# Patient Record
Sex: MALE | Race: BLACK OR AFRICAN AMERICAN | NOT HISPANIC OR LATINO | URBAN - METROPOLITAN AREA
[De-identification: names, ages, dates, MRNs, and addresses within clinical notes are randomized per-mention and may not be internally consistent; named-entity substitution may affect disease eponyms.]

---

## 2021-08-14 ENCOUNTER — INPATIENT (INPATIENT)
Facility: HOSPITAL | Age: 21
LOS: 0 days | Discharge: HOME | DRG: 310 | End: 2021-08-15
Attending: INTERNAL MEDICINE | Admitting: INTERNAL MEDICINE
Payer: COMMERCIAL

## 2021-08-14 VITALS
RESPIRATION RATE: 16 BRPM | WEIGHT: 199.96 LBS | OXYGEN SATURATION: 98 % | HEART RATE: 77 BPM | TEMPERATURE: 98 F | SYSTOLIC BLOOD PRESSURE: 126 MMHG | HEIGHT: 72 IN | DIASTOLIC BLOOD PRESSURE: 79 MMHG

## 2021-08-14 LAB
ALBUMIN SERPL ELPH-MCNC: 5.1 G/DL — HIGH (ref 3.4–5)
ALP SERPL-CCNC: 93 U/L — SIGNIFICANT CHANGE UP (ref 40–120)
ALT FLD-CCNC: 34 U/L — SIGNIFICANT CHANGE UP (ref 12–42)
ANION GAP SERPL CALC-SCNC: 12 MMOL/L — SIGNIFICANT CHANGE UP (ref 9–16)
AST SERPL-CCNC: 33 U/L — SIGNIFICANT CHANGE UP (ref 15–37)
BASOPHILS # BLD AUTO: 0.04 K/UL — SIGNIFICANT CHANGE UP (ref 0–0.2)
BASOPHILS NFR BLD AUTO: 0.4 % — SIGNIFICANT CHANGE UP (ref 0–2)
BILIRUB SERPL-MCNC: 1.1 MG/DL — SIGNIFICANT CHANGE UP (ref 0.2–1.2)
BUN SERPL-MCNC: 22 MG/DL — SIGNIFICANT CHANGE UP (ref 7–23)
CALCIUM SERPL-MCNC: 10.3 MG/DL — SIGNIFICANT CHANGE UP (ref 8.5–10.5)
CHLORIDE SERPL-SCNC: 101 MMOL/L — SIGNIFICANT CHANGE UP (ref 96–108)
CO2 SERPL-SCNC: 26 MMOL/L — SIGNIFICANT CHANGE UP (ref 22–31)
CREAT SERPL-MCNC: 1.43 MG/DL — HIGH (ref 0.5–1.3)
EOSINOPHIL # BLD AUTO: 0 K/UL — SIGNIFICANT CHANGE UP (ref 0–0.5)
EOSINOPHIL NFR BLD AUTO: 0 % — SIGNIFICANT CHANGE UP (ref 0–6)
GLUCOSE SERPL-MCNC: 83 MG/DL — SIGNIFICANT CHANGE UP (ref 70–99)
HCT VFR BLD CALC: 47 % — SIGNIFICANT CHANGE UP (ref 39–50)
HGB BLD-MCNC: 15.8 G/DL — SIGNIFICANT CHANGE UP (ref 13–17)
IMM GRANULOCYTES NFR BLD AUTO: 0.6 % — SIGNIFICANT CHANGE UP (ref 0–1.5)
LYMPHOCYTES # BLD AUTO: 0.85 K/UL — LOW (ref 1–3.3)
LYMPHOCYTES # BLD AUTO: 8.2 % — LOW (ref 13–44)
MAGNESIUM SERPL-MCNC: 1.9 MG/DL — SIGNIFICANT CHANGE UP (ref 1.6–2.6)
MCHC RBC-ENTMCNC: 27.3 PG — SIGNIFICANT CHANGE UP (ref 27–34)
MCHC RBC-ENTMCNC: 33.6 GM/DL — SIGNIFICANT CHANGE UP (ref 32–36)
MCV RBC AUTO: 81.2 FL — SIGNIFICANT CHANGE UP (ref 80–100)
MONOCYTES # BLD AUTO: 0.7 K/UL — SIGNIFICANT CHANGE UP (ref 0–0.9)
MONOCYTES NFR BLD AUTO: 6.8 % — SIGNIFICANT CHANGE UP (ref 2–14)
NEUTROPHILS # BLD AUTO: 8.69 K/UL — HIGH (ref 1.8–7.4)
NEUTROPHILS NFR BLD AUTO: 84 % — HIGH (ref 43–77)
NRBC # BLD: 0 /100 WBCS — SIGNIFICANT CHANGE UP (ref 0–0)
PLATELET # BLD AUTO: 209 K/UL — SIGNIFICANT CHANGE UP (ref 150–400)
POTASSIUM SERPL-MCNC: 5.2 MMOL/L — SIGNIFICANT CHANGE UP (ref 3.5–5.3)
POTASSIUM SERPL-SCNC: 5.2 MMOL/L — SIGNIFICANT CHANGE UP (ref 3.5–5.3)
PROT SERPL-MCNC: 8.6 G/DL — HIGH (ref 6.4–8.2)
RBC # BLD: 5.79 M/UL — SIGNIFICANT CHANGE UP (ref 4.2–5.8)
RBC # FLD: 13.2 % — SIGNIFICANT CHANGE UP (ref 10.3–14.5)
SARS-COV-2 RNA SPEC QL NAA+PROBE: SIGNIFICANT CHANGE UP
SODIUM SERPL-SCNC: 139 MMOL/L — SIGNIFICANT CHANGE UP (ref 132–145)
TROPONIN I SERPL-MCNC: <0.017 NG/ML — LOW (ref 0.02–0.06)
TSH SERPL-MCNC: 1.43 UIU/ML — SIGNIFICANT CHANGE UP (ref 0.36–3.74)
WBC # BLD: 10.34 K/UL — SIGNIFICANT CHANGE UP (ref 3.8–10.5)
WBC # FLD AUTO: 10.34 K/UL — SIGNIFICANT CHANGE UP (ref 3.8–10.5)

## 2021-08-14 PROCEDURE — 99285 EMERGENCY DEPT VISIT HI MDM: CPT | Mod: 25

## 2021-08-14 PROCEDURE — 71045 X-RAY EXAM CHEST 1 VIEW: CPT | Mod: 26,77

## 2021-08-14 PROCEDURE — 71045 X-RAY EXAM CHEST 1 VIEW: CPT | Mod: 26

## 2021-08-14 PROCEDURE — 93010 ELECTROCARDIOGRAM REPORT: CPT

## 2021-08-14 RX ORDER — SODIUM CHLORIDE 9 MG/ML
1000 INJECTION INTRAMUSCULAR; INTRAVENOUS; SUBCUTANEOUS ONCE
Refills: 0 | Status: COMPLETED | OUTPATIENT
Start: 2021-08-14 | End: 2021-08-14

## 2021-08-14 RX ORDER — METOPROLOL TARTRATE 50 MG
5 TABLET ORAL ONCE
Refills: 0 | Status: COMPLETED | OUTPATIENT
Start: 2021-08-14 | End: 2021-08-14

## 2021-08-14 RX ORDER — DILTIAZEM HCL 120 MG
5 CAPSULE, EXT RELEASE 24 HR ORAL
Qty: 125 | Refills: 0 | Status: DISCONTINUED | OUTPATIENT
Start: 2021-08-14 | End: 2021-08-14

## 2021-08-14 RX ADMIN — SODIUM CHLORIDE 2000 MILLILITER(S): 9 INJECTION INTRAMUSCULAR; INTRAVENOUS; SUBCUTANEOUS at 17:51

## 2021-08-14 RX ADMIN — SODIUM CHLORIDE 2000 MILLILITER(S): 9 INJECTION INTRAMUSCULAR; INTRAVENOUS; SUBCUTANEOUS at 19:05

## 2021-08-14 RX ADMIN — Medication 5 MILLIGRAM(S): at 19:05

## 2021-08-14 RX ADMIN — Medication 5 MG/HR: at 22:58

## 2021-08-14 NOTE — ED ADULT TRIAGE NOTE - CHIEF COMPLAINT QUOTE
sent from urgent care for eval of palpations that developed while pt was jogging, denies pmh of arrythmia's

## 2021-08-14 NOTE — ED ADULT NURSE NOTE - NSIMPLEMENTINTERV_GEN_ALL_ED
Implemented All Universal Safety Interventions:  [place] to call system. Call bell, personal items and telephone within reach. Instruct patient to call for assistance. Room bathroom lighting operational. Non-slip footwear when patient is off stretcher. Physically safe environment: no spills, clutter or unnecessary equipment. Stretcher in lowest position, wheels locked, appropriate side rails in place.

## 2021-08-14 NOTE — ED ADULT NURSE NOTE - OBJECTIVE STATEMENT
Patient to ED s/p going for a run today and started feeling like his heart was skipping a beat. He then went to urgent care when it did not improve and they took an EKG which showed an irregular rhythm and sent him here. He has never had any similar symptoms.

## 2021-08-14 NOTE — ED ADULT NURSE REASSESSMENT NOTE - NS ED NURSE REASSESS COMMENT FT1
Pt received from ISIAH Forbes. Pt resting comfortably in stretcher, continuous cardiac monitoring maintained. Pt denies complaints at this time, ambulatory with steady gait to the restroom. Noted irregular HR on monitor, rate ranging from 60s-80s. Pt denies palpitations, CP, SOB. No diaphoresis, no dyspnea noted. Breathing spontaneous and nonlabored.

## 2021-08-14 NOTE — ED ADULT NURSE REASSESSMENT NOTE - NS ED NURSE REASSESS COMMENT FT1
Noted pt HR to be in the range of 50s-60s. MD made aware, cardizem drip discontinued. Pt cleared for transport. Breathing spontaneous and nonlabored, pt denies complaints at this time.

## 2021-08-14 NOTE — ED PROVIDER NOTE - OBJECTIVE STATEMENT
20 y/o M, otherwise healthy, went for a run today and started feeling like his heart was skipping a beat. He then went to urgent care when it did not improve and they took an EKG which showed an irregular rhythm and sent him here. He has never had any similar symptoms. Denies any Hx of athymias. Denies any drug use. Non smoker. No leg pain or swelling. 20 y/o M, otherwise healthy, went for a run today and started feeling like his heart was skipping a beat. He then went to urgent care when it did not improve and they took an EKG which showed an irregular rhythm and sent him here. He has never had any similar symptoms. Denies any Hx of athymias. Denies any drug use. Non smoker. No leg pain or swelling. Denies any pre workout or supplement use.

## 2021-08-15 ENCOUNTER — TRANSCRIPTION ENCOUNTER (OUTPATIENT)
Age: 21
End: 2021-08-15

## 2021-08-15 VITALS
SYSTOLIC BLOOD PRESSURE: 144 MMHG | HEART RATE: 66 BPM | RESPIRATION RATE: 18 BRPM | OXYGEN SATURATION: 96 % | DIASTOLIC BLOOD PRESSURE: 67 MMHG

## 2021-08-15 DIAGNOSIS — J45.909 UNSPECIFIED ASTHMA, UNCOMPLICATED: ICD-10-CM

## 2021-08-15 DIAGNOSIS — I48.91 UNSPECIFIED ATRIAL FIBRILLATION: ICD-10-CM

## 2021-08-15 DIAGNOSIS — I48.92 UNSPECIFIED ATRIAL FLUTTER: ICD-10-CM

## 2021-08-15 LAB
A1C WITH ESTIMATED AVERAGE GLUCOSE RESULT: 5.7 % — HIGH (ref 4–5.6)
ALBUMIN SERPL ELPH-MCNC: 4.2 G/DL — SIGNIFICANT CHANGE UP (ref 3.3–5)
ALP SERPL-CCNC: 79 U/L — SIGNIFICANT CHANGE UP (ref 40–120)
ALT FLD-CCNC: 18 U/L — SIGNIFICANT CHANGE UP (ref 10–45)
AMPHET UR-MCNC: NEGATIVE — SIGNIFICANT CHANGE UP
ANION GAP SERPL CALC-SCNC: 11 MMOL/L — SIGNIFICANT CHANGE UP (ref 5–17)
APPEARANCE UR: CLEAR — SIGNIFICANT CHANGE UP
APTT BLD: 34.9 SEC — SIGNIFICANT CHANGE UP (ref 27.5–35.5)
AST SERPL-CCNC: 23 U/L — SIGNIFICANT CHANGE UP (ref 10–40)
BARBITURATES UR SCN-MCNC: NEGATIVE — SIGNIFICANT CHANGE UP
BENZODIAZ UR-MCNC: NEGATIVE — SIGNIFICANT CHANGE UP
BILIRUB SERPL-MCNC: 0.6 MG/DL — SIGNIFICANT CHANGE UP (ref 0.2–1.2)
BILIRUB UR-MCNC: NEGATIVE — SIGNIFICANT CHANGE UP
BUN SERPL-MCNC: 20 MG/DL — SIGNIFICANT CHANGE UP (ref 7–23)
CALCIUM SERPL-MCNC: 9.3 MG/DL — SIGNIFICANT CHANGE UP (ref 8.4–10.5)
CHLORIDE SERPL-SCNC: 106 MMOL/L — SIGNIFICANT CHANGE UP (ref 96–108)
CHOLEST SERPL-MCNC: 169 MG/DL — SIGNIFICANT CHANGE UP
CO2 SERPL-SCNC: 24 MMOL/L — SIGNIFICANT CHANGE UP (ref 22–31)
COCAINE METAB.OTHER UR-MCNC: NEGATIVE — SIGNIFICANT CHANGE UP
COLOR SPEC: YELLOW — SIGNIFICANT CHANGE UP
COVID-19 SPIKE DOMAIN AB INTERP: POSITIVE
COVID-19 SPIKE DOMAIN ANTIBODY RESULT: >250 U/ML — HIGH
CREAT SERPL-MCNC: 1.32 MG/DL — HIGH (ref 0.5–1.3)
DIFF PNL FLD: NEGATIVE — SIGNIFICANT CHANGE UP
ESTIMATED AVERAGE GLUCOSE: 117 MG/DL — HIGH (ref 68–114)
GLUCOSE SERPL-MCNC: 82 MG/DL — SIGNIFICANT CHANGE UP (ref 70–99)
GLUCOSE UR QL: NEGATIVE — SIGNIFICANT CHANGE UP
HCT VFR BLD CALC: 47.6 % — SIGNIFICANT CHANGE UP (ref 39–50)
HDLC SERPL-MCNC: 62 MG/DL — SIGNIFICANT CHANGE UP
HGB BLD-MCNC: 15.2 G/DL — SIGNIFICANT CHANGE UP (ref 13–17)
INR BLD: 0.98 — SIGNIFICANT CHANGE UP (ref 0.88–1.16)
KETONES UR-MCNC: NEGATIVE — SIGNIFICANT CHANGE UP
LEUKOCYTE ESTERASE UR-ACNC: NEGATIVE — SIGNIFICANT CHANGE UP
LIPID PNL WITH DIRECT LDL SERPL: 96 MG/DL — SIGNIFICANT CHANGE UP
MAGNESIUM SERPL-MCNC: 2.1 MG/DL — SIGNIFICANT CHANGE UP (ref 1.6–2.6)
MCHC RBC-ENTMCNC: 26.3 PG — LOW (ref 27–34)
MCHC RBC-ENTMCNC: 31.9 GM/DL — LOW (ref 32–36)
MCV RBC AUTO: 82.5 FL — SIGNIFICANT CHANGE UP (ref 80–100)
METHADONE UR-MCNC: NEGATIVE — SIGNIFICANT CHANGE UP
NITRITE UR-MCNC: NEGATIVE — SIGNIFICANT CHANGE UP
NON HDL CHOLESTEROL: 107 MG/DL — SIGNIFICANT CHANGE UP
NRBC # BLD: 0 /100 WBCS — SIGNIFICANT CHANGE UP (ref 0–0)
OPIATES UR-MCNC: NEGATIVE — SIGNIFICANT CHANGE UP
PCP SPEC-MCNC: SIGNIFICANT CHANGE UP
PCP UR-MCNC: NEGATIVE — SIGNIFICANT CHANGE UP
PH UR: 6 — SIGNIFICANT CHANGE UP (ref 5–8)
PLATELET # BLD AUTO: 207 K/UL — SIGNIFICANT CHANGE UP (ref 150–400)
POTASSIUM SERPL-MCNC: 4.2 MMOL/L — SIGNIFICANT CHANGE UP (ref 3.5–5.3)
POTASSIUM SERPL-SCNC: 4.2 MMOL/L — SIGNIFICANT CHANGE UP (ref 3.5–5.3)
PROT SERPL-MCNC: 6.6 G/DL — SIGNIFICANT CHANGE UP (ref 6–8.3)
PROT UR-MCNC: NEGATIVE MG/DL — SIGNIFICANT CHANGE UP
PROTHROM AB SERPL-ACNC: 11.8 SEC — SIGNIFICANT CHANGE UP (ref 10.6–13.6)
RBC # BLD: 5.77 M/UL — SIGNIFICANT CHANGE UP (ref 4.2–5.8)
RBC # FLD: 13.6 % — SIGNIFICANT CHANGE UP (ref 10.3–14.5)
SARS-COV-2 IGG+IGM SERPL QL IA: >250 U/ML — HIGH
SARS-COV-2 IGG+IGM SERPL QL IA: POSITIVE
SODIUM SERPL-SCNC: 141 MMOL/L — SIGNIFICANT CHANGE UP (ref 135–145)
SP GR SPEC: >=1.03 — SIGNIFICANT CHANGE UP (ref 1–1.03)
THC UR QL: NEGATIVE — SIGNIFICANT CHANGE UP
TRIGL SERPL-MCNC: 56 MG/DL — SIGNIFICANT CHANGE UP
UROBILINOGEN FLD QL: 0.2 E.U./DL — SIGNIFICANT CHANGE UP
WBC # BLD: 6.1 K/UL — SIGNIFICANT CHANGE UP (ref 3.8–10.5)
WBC # FLD AUTO: 6.1 K/UL — SIGNIFICANT CHANGE UP (ref 3.8–10.5)

## 2021-08-15 PROCEDURE — 87635 SARS-COV-2 COVID-19 AMP PRB: CPT

## 2021-08-15 PROCEDURE — 71045 X-RAY EXAM CHEST 1 VIEW: CPT

## 2021-08-15 PROCEDURE — 84443 ASSAY THYROID STIM HORMONE: CPT

## 2021-08-15 PROCEDURE — 96374 THER/PROPH/DIAG INJ IV PUSH: CPT

## 2021-08-15 PROCEDURE — 99223 1ST HOSP IP/OBS HIGH 75: CPT

## 2021-08-15 PROCEDURE — 85610 PROTHROMBIN TIME: CPT

## 2021-08-15 PROCEDURE — 80061 LIPID PANEL: CPT

## 2021-08-15 PROCEDURE — 80307 DRUG TEST PRSMV CHEM ANLYZR: CPT

## 2021-08-15 PROCEDURE — 80053 COMPREHEN METABOLIC PANEL: CPT

## 2021-08-15 PROCEDURE — 84484 ASSAY OF TROPONIN QUANT: CPT

## 2021-08-15 PROCEDURE — 83735 ASSAY OF MAGNESIUM: CPT

## 2021-08-15 PROCEDURE — 99253 IP/OBS CNSLTJ NEW/EST LOW 45: CPT

## 2021-08-15 PROCEDURE — 86769 SARS-COV-2 COVID-19 ANTIBODY: CPT

## 2021-08-15 PROCEDURE — 81003 URINALYSIS AUTO W/O SCOPE: CPT

## 2021-08-15 PROCEDURE — 99285 EMERGENCY DEPT VISIT HI MDM: CPT

## 2021-08-15 PROCEDURE — 83036 HEMOGLOBIN GLYCOSYLATED A1C: CPT

## 2021-08-15 PROCEDURE — 85730 THROMBOPLASTIN TIME PARTIAL: CPT

## 2021-08-15 PROCEDURE — 36415 COLL VENOUS BLD VENIPUNCTURE: CPT

## 2021-08-15 PROCEDURE — 85027 COMPLETE CBC AUTOMATED: CPT

## 2021-08-15 PROCEDURE — 85025 COMPLETE CBC W/AUTO DIFF WBC: CPT

## 2021-08-15 PROCEDURE — 93005 ELECTROCARDIOGRAM TRACING: CPT

## 2021-08-15 RX ORDER — DILTIAZEM HCL 120 MG
1 CAPSULE, EXT RELEASE 24 HR ORAL
Qty: 30 | Refills: 2
Start: 2021-08-15 | End: 2021-11-12

## 2021-08-15 RX ORDER — SODIUM CHLORIDE 9 MG/ML
1000 INJECTION INTRAMUSCULAR; INTRAVENOUS; SUBCUTANEOUS
Refills: 0 | Status: DISCONTINUED | OUTPATIENT
Start: 2021-08-15 | End: 2021-08-15

## 2021-08-15 RX ORDER — ALBUTEROL 90 UG/1
0 AEROSOL, METERED ORAL
Qty: 0 | Refills: 0 | DISCHARGE

## 2021-08-15 RX ORDER — FLECAINIDE ACETATE 50 MG
300 TABLET ORAL ONCE
Refills: 0 | Status: COMPLETED | OUTPATIENT
Start: 2021-08-15 | End: 2021-08-15

## 2021-08-15 RX ORDER — APIXABAN 2.5 MG/1
5 TABLET, FILM COATED ORAL EVERY 12 HOURS
Refills: 0 | Status: DISCONTINUED | OUTPATIENT
Start: 2021-08-15 | End: 2021-08-15

## 2021-08-15 RX ORDER — DILTIAZEM HCL 120 MG
120 CAPSULE, EXT RELEASE 24 HR ORAL DAILY
Refills: 0 | Status: DISCONTINUED | OUTPATIENT
Start: 2021-08-15 | End: 2021-08-15

## 2021-08-15 RX ORDER — APIXABAN 2.5 MG/1
1 TABLET, FILM COATED ORAL
Qty: 14 | Refills: 0
Start: 2021-08-15 | End: 2021-08-21

## 2021-08-15 RX ADMIN — Medication 300 MILLIGRAM(S): at 15:06

## 2021-08-15 RX ADMIN — Medication 120 MILLIGRAM(S): at 12:30

## 2021-08-15 RX ADMIN — APIXABAN 5 MILLIGRAM(S): 2.5 TABLET, FILM COATED ORAL at 12:30

## 2021-08-15 RX ADMIN — SODIUM CHLORIDE 100 MILLILITER(S): 9 INJECTION INTRAMUSCULAR; INTRAVENOUS; SUBCUTANEOUS at 09:47

## 2021-08-15 NOTE — PROGRESS NOTE ADULT - ASSESSMENT
22yo Male w/ FHx of HTN (denies FHx of cardiac arrhythmias) and PMHx Asthma who presents to [facility] ED 8/14/2021 endorsing palpitations. Found to be in new onset Atrial fib. Pt admitted to cardiac telemetry w/ plan for EP consult

## 2021-08-15 NOTE — DISCHARGE NOTE PROVIDER - PROVIDER TOKENS
PROVIDER:[TOKEN:[[id number]],FOLLOWUP:[2 weeks]],PROVIDER:[TOKEN:[[id number]],FOLLOWUP:[2 weeks]]

## 2021-08-15 NOTE — DISCHARGE NOTE PROVIDER - CARE PROVIDER_API CALL
Elliot Heller)  Cardiovascular Disease  [address] [place] [address], [address] [place] [id number]  Phone: [phone]  Fax: [phone]  Follow Up Time: 2 weeks    Delmi Willett)  Cardiac Electrophysiology; Cardiovascular Disease; Internal Medicine  [address] [address]  Phone: [phone]  Fax: [phone]  Follow Up Time: 2 weeks

## 2021-08-15 NOTE — H&P ADULT - PROBLEM SELECTOR PLAN 1
--Onset of palpiations after running; [facility] pt is persistently A Flutter.    --s/p Lopressor 5mg IV x1 and then initiated on Diltiazem gtt at dose rate 5mL/Hr, which was discontinued prior to transfer to [facility].     --TSH WNL; denies recreational drugs; denies use of pre-work out or supplements; denies caffeine.    --HR ranging 50-80s.   --Urine toxicology ordered (but pt denies drug use; reliable).   --No indication for rate controlling agents or anticoagulation at this time (CHADS-Vasc 0).   --PLAN: admit to cardiac telemetry for monitoring; EP consult in AM; TTE ordered.

## 2021-08-15 NOTE — H&P ADULT - PROBLEM SELECTOR PLAN 2
--Pt takes albuterol as needed.   --No exacerbation at this time.       Dispo: pending EP evaluation.

## 2021-08-15 NOTE — CONSULT NOTE ADULT - ASSESSMENT
1. New-onset atrial fibrillation    -Pathophysiology and risks of atrial fibrillation were discussed in detail.  His apparent CHADS-VASc is 0, but since he will likely need either chemical or electrical conversion, continue Eliquis.  -Options for rhythm control were reviewed.  This is his first episode, so we can attempt cardioversion only without additional medication and monitor for recurrence.  We also discussed antiarrhythmic medications and ablation as options in the future.  -Check echo (either while here if he is here tomorrow, or as outpatient).  -Flecainide 300mg x 1 dose to attempt chemical conversion.  Monitor on telemetry.  -If he converts to sinus rhythm, he can be discharged later today.  Given the duration of AF, continue Eliquis post-conversion for one week.  -If he remains in AF, will plan cardioversion tomorrow.  Procedure and risks were discussed.  -He should have outpatient EP follow-up.

## 2021-08-15 NOTE — DISCHARGE NOTE PROVIDER - CARE PROVIDERS DIRECT ADDRESSES
[email or web address]

## 2021-08-15 NOTE — PROGRESS NOTE ADULT - PROBLEM SELECTOR PLAN 1
Onset of palpitations after running; [facility] pt is persistently A Flutter. Appears to be in Afib @ [facility].   - initially received IV lopressor and Dilt Gtt @ [facility] which was discontinued as patient HR controlled in 50-80s.  This AM 8/15 patient HR increased to 90-100s.   - Started on Cardizem XL 120mg   - CHADS-Vasc 0, but given potential plan for cardioversion started on Eliquis 5mg PO BID.   - EP consulted, f/u recs.   - TSH WNL; denies recreational drugs; denies use of pre-work out or supplements; denies caffeine. Utox negative.   - Echo ordered.

## 2021-08-15 NOTE — PROGRESS NOTE ADULT - SUBJECTIVE AND OBJECTIVE BOX
Interventional Cardiology PA Adult Progress Note    Subjective Assessment: Patient seen and examined at bedside, no current compoaints, intermittent feels the palpitations but mild  ROS negative except per HPI and subjective  	  MEDICATIONS:  diltiazem    milliGRAM(s) Oral daily  apixaban 5 milliGRAM(s) Oral every 12 hours  sodium chloride 0.9%. 1000 milliLiter(s) IV Continuous <Continuous>    [PHYSICAL EXAM:  TELEMETRY:  T(C): 36.8 (08-15-21 @ 05:20), Max: 37.2 (08-14-21 @ 21:32)  HR: 60 (08-15-21 @ 00:00) (56 - 82)  BP: 131/88 (08-15-21 @ 00:00) (126/79 - 135/66)  RR: 18 (08-15-21 @ 00:00) (16 - 18)  SpO2: 98% (08-15-21 @ 00:00) (98% - 99%)    I&O's Summary    14 Aug 2021 07:01  -  15 Aug 2021 07:00  --------------------------------------------------------  IN: 180 mL / OUT: 0 mL / NET: 180 mL    15 Aug 2021 07:01  -  15 Aug 2021 11:50  --------------------------------------------------------  IN: 300 mL / OUT: 0 mL / NET: 300 mL      Height (cm): 182.9 (08-14 @ 17:13)  Weight (kg): 90.7 (08-14 @ 17:13)  BMI (kg/m2): 27.1 (08-14 @ 17:13)  BSA (m2): 2.13 (08-14 @ 17:13)                                      Appearance: Normal	  HEENT:   Normal oral mucosa, PERRL, EOMI	  Neck: Supple, - JVD; Carotid Bruit   Cardiovascular: Normal S1 S2, No JVD, No murmurs,   Respiratory: Lungs clear to auscultation//No Rales, Rhonchi, Wheezing	  Gastrointestinal:  Soft, Non-tender, + BS	  Skin: No rashes, No ecchymoses, No cyanosis  Extremities: Normal range of motion, No clubbing, cyanosis or edema  Vascular: Peripheral pulses palpable 2+ bilaterally  Neurologic: Non-focal  Psychiatry: A & O x 3, Mood & affect appropriate      LABS:	 	  CARDIAC MARKERS:  Troponin I, Serum: <0.017 ng/mL (08-14 @ 17:52)  Troponin I, Serum: <0.017 ng/mL (08-14 @ 17:52)                          15.2   6.10  )-----------( 207      ( 15 Aug 2021 07:09 )             47.6     08-15    141  |  106  |  20  ----------------------------<  82  4.2   |  24  |  1.32<H>    Ca    9.3      15 Aug 2021 07:09  Mg     2.1     08-15    TPro  6.6  /  Alb  4.2  /  TBili  0.6  /  DBili  x   /  AST  23  /  ALT  18  /  AlkPhos  79  08-15    proBNP:   Lipid Profile:   HgA1c:   TSH: Thyroid Stimulating Hormone, Serum: 1.430 uIU/mL (08-14 @ 17:52)    PT/INR - ( 15 Aug 2021 07:09 )   PT: 11.8 sec;   INR: 0.98          PTT - ( 15 Aug 2021 07:09 )  PTT:34.9 sec    ASSESSMENT/PLAN: 	        DVT ppx:  Dispo:

## 2021-08-15 NOTE — H&P ADULT - NSHPLABSRESULTS_GEN_ALL_CORE
15.8   10.34 )-----------( 209      ( 14 Aug 2021 17:52 )             47.0     139  |  101  |  22  ----------------------------<  83  5.2   |  26  |  1.43<H>    Ca    10.3      14 Aug 2021 17:52  Mg     1.9     08-14    TPro  8.6<H>  /  Alb  5.1<H>  /  TBili  1.1  /  DBili  x   /  AST  33  /  ALT  34  /  AlkPhos  93  08-14    CARDIAC MARKERS ( 14 Aug 2021 17:52 )  <0.017 ng/mL / x     / x     / x     / x          EKG: atrial flutter rate controlled

## 2021-08-15 NOTE — H&P ADULT - HISTORY OF PRESENT ILLNESS
Pt is 22yo Male w/ FHx of HTN (denies FHx of cardiac arrhythmias) and PMHx Asthma who presents to [facility] ED 8/14/2021 endorsing palpitations. Pt states that when he went for a run today and started to feel like his heart was skipping a beat. He went to an urgent care, and was sent to the ED due to irregular rhythm. He has never had symptoms. Pt denies Hx of arrhythmias, denies recreational drug use, denies tobacco use, denies use of caffeine or pre-workout or supplement use. Pt denies CP, SOB, dizziness/syncope, LE edema, orthopnea, abdominal pain, N/V, fever/chills, pain in lower extremities.      VITALS: HR 56-82, BP 130s/66-82, O2 98% RA, T 98.5F. LABS: WBC 10.34, Hgb/Hct 15.8/47.0, Plt Cnt 209; Na 138, K 5.2, Mg 1.9; BUN/Cr 22/1.43; TSH WNL; Trop I (-); COVID-19 (-). CXR wet read grossly normal. EKG atrial flutter.      At [facility] ED, pt received Lopressor 5mg IV x1, and then was initiated on Diltiazem gtt at 5mL/Hr (which was subsequently discontinued prior to transfer as HR was well controlled).      Patient transferred to [facility] cardiology/telemetry for further work up and management of new onset atrial flutter.

## 2021-08-15 NOTE — DISCHARGE NOTE PROVIDER - NSDCMRMEDTOKEN_GEN_ALL_CORE_FT
albuterol:   apixaban 5 mg oral tablet: 1 tab(s) orally every 12 hours  dilTIAZem 120 mg/24 hours oral capsule, extended release: 1 cap(s) orally once a day

## 2021-08-15 NOTE — DISCHARGE NOTE NURSING/CASE MANAGEMENT/SOCIAL WORK - PATIENT PORTAL LINK FT
You can access the FollowMyHealth Patient Portal offered by [facility] by registering at the following website: [email or web address]. By joining [facility]’s FollowMyHealth portal, you will also be able to view your health information using other applications (apps) compatible with our system.

## 2021-08-15 NOTE — DISCHARGE NOTE PROVIDER - NSDCCPCAREPLAN_GEN_ALL_CORE_FT
PRINCIPAL DISCHARGE DIAGNOSIS  Diagnosis: Atrial fibrillation and flutter  Assessment and Plan of Treatment: You were found to have atrial fibrillation which is an irregular heart rythm that can effect the function of your heart and put you at a higher risk for stroke  - You were evaluated by the electrophysiolgy team and received an anti arrythmic medication called Flecainide and converted back to normal sinus ryhtm  - please Start taking Cardizem XL 120mg daily for control of your heart rate  - please start taking Eliquis 5mg Twice daily for one week only - blood thinner to prevent blood clot  - Follow up with Dr. Heller (Cardiologist) @ [facility] in 1-2 weeks  - Follow up wiht Dr. Willett @ [facility] (Electrophysiologist) in 1-2 weeks.

## 2021-08-15 NOTE — DISCHARGE NOTE PROVIDER - NSDCFUSCHEDAPPT_GEN_ALL_CORE_FT
JULIO MCKEON ; 08/18/2021 ; [facility] [address]  JULIO MCKEON ; 10/25/2021 ; NPP Cardio V[facility] [address]

## 2021-08-15 NOTE — H&P ADULT - ASSESSMENT
Pt is 20yo Male w/ FHx of HTN (denies FHx of cardiac arrhythmias) and PMHx Asthma who presents to [facility] ED 8/14/2021 endorsing palpitations. Found to be in new onset Atrial flutter. Pt is rate controlled w/ HR 50-80s. No indicated for rate control agent or anticoagulation at this time. Pt admitted to cardiac telemetry. TSH normal, no drugs per pt (toxicology pending), denies caffeine (no coffee), denies preworkout or supplement use. EP to be consulted in morning.

## 2021-08-15 NOTE — CONSULT NOTE ADULT - SUBJECTIVE AND OBJECTIVE BOX
22 yo male with asthma who is admitted with new-onset atrial fibrillation.  He went for a run yesterday and after finishing he felt ok.  Shortly thereafter, he felt his heart skipping a beat.  He took a cold shower and tried to calm down, but the feeling continued so he went to urgent care and was sent to the ER.  He was treated with IV metoprolol, and subsequently cardizem drip.  Rates improved, so the drip was discontinued.  He was given additional cardizem this morning.  He was given a dose of Eliquis just now.    He remains in atrial fibrillation and notes continued palpitations.  He denies any similar prior episodes.  No family history of arrhythmia or sudden death.  Notes family history of hypertension.  He took his inhaler for asthma as he normally does prior to running.  Notes occasional alcohol use.  Denies smoking or illicit drug use.  He denies chest pain or dyspnea with exertion.  No dizziness or syncope.  No fever, chills, nausea, vomiting, or diarrhea.  He was otherwise feeling normal yesterday.    PMH: as above  Home Meds: asthma inhaler    All: NKDA    Soc: as per HPI    EXAM:  Vital Signs Last 24 Hrs  T(C): 36.8 (15 Aug 2021 05:20), Max: 37.2 (14 Aug 2021 21:32)  T(F): 98.3 (15 Aug 2021 05:20), Max: 99 (14 Aug 2021 21:32)  HR: 76 (15 Aug 2021 12:30) (56 - 82)  BP: 124/82 (15 Aug 2021 12:30) (124/82 - 139/80)  BP(mean): 104 (15 Aug 2021 00:00) (104 - 104)  RR: 18 (15 Aug 2021 12:30) (16 - 18)  SpO2: 99% (15 Aug 2021 12:30) (96% - 99%)    -nad, in bed  -alert and oriented  -no carotid bruit  -irregular, no murmurs or gallops  -lungs clear  -soft, nt  -no sig edema  -normal mood and affect                          15.2   6.10  )-----------( 207      ( 15 Aug 2021 07:09 )             47.6   08-15    141  |  106  |  20  ----------------------------<  82  4.2   |  24  |  1.32<H>    Ca    9.3      15 Aug 2021 07:09  Mg     2.1     08-15    TPro  6.6  /  Alb  4.2  /  TBili  0.6  /  DBili  x   /  AST  23  /  ALT  18  /  AlkPhos  79  08-15  PT/INR - ( 15 Aug 2021 07:09 )   PT: 11.8 sec;   INR: 0.98          PTT - ( 15 Aug 2021 07:09 )  PTT:34.9 sec    EKG: sinus rhythm

## 2021-08-15 NOTE — DISCHARGE NOTE PROVIDER - HOSPITAL COURSE
20yo Male w/ FHx of HTN (denies FHx of cardiac arrhythmias) and PMHx Asthma who presents to [facility] ED 8/14/2021 endorsing palpitations. Found to be in new onset Atrial fib. Pt admitted to cardiac telemetry w/ plan for EP consult    Problem/Plan - 1:  ·  Problem: Atrial flutter.  Plan: --Onset of palpiations after running; [facility] pt is persistently A Flutter.    --s/p Lopressor 5mg IV x1 and then initiated on Diltiazem gtt at dose rate 5mL/Hr, which was discontinued prior to transfer to [facility].     --TSH WNL; denies recreational drugs; denies use of pre-work out or supplements; denies caffeine.    --HR ranging 50-80s.   --Urine toxicology ordered (but pt denies drug use; reliable).   --No indication for rate controlling agents or anticoagulation at this time (CHADS-Vasc 0).   --PLAN: admit to cardiac telemetry for monitoring; EP consult in AM; TTE ordered.      Problem/Plan - 2:  ·  Problem: Asthma.  Plan: --Pt takes albuterol as needed.   --No exacerbation at this time.       Dispo: pending EP evaluation.    Problem/Plan - 1:  ·  Problem: Atrial fibrillation and flutter.  Plan: Onset of palpitations after running; [facility] pt is persistently A Flutter. Appears to be in Afib @ [facility].   - initially received IV lopressor and Dilt Gtt @ [facility] which was discontinued as patient HR controlled in 50-80s.  This AM 8/15 patient HR increased to 90-100s.   - Started on Cardizem XL 120mg   - CHADS-Vasc 0, but given potential plan for cardioversion started on Eliquis 5mg PO BID.   - EP consulted, f/u recs.   - TSH WNL; denies recreational drugs; denies use of pre-work out or supplements; denies caffeine. Utox negative.   - Echo ordered.      Problem/Plan - 2:  ·  Problem: Asthma.  Plan: --Pt takes albuterol as needed.   --No exacerbation at this time.       Dispo: pending EP evaluation.    20yo Male w/ FHx of HTN (denies FHx of cardiac arrhythmias) and PMHx Asthma who presents to [facility] ED 8/14/2021 endorsing palpitations after finishing a run.  At [facility] ED EKG revealing Aflutter.  Patient s/p Lopressor and Dilt Gtt which was discontinued initially as patient HR controlled.  Patient transferred to [facility] for new Afib.  THS wnl.  Urine Toxicology negative and patient denies any drug use, supplementation, ETOH, or significant caffeine use.  Patient initiated on Cardizem [id number] QD.  EP service consulted and patient is s/p Eliquis and Flecainide 300mg PO x1 w/ successful conversion to NSR.  Labs, Vitals, meds reviewed with Dr. Macdonald and patient deemed stable for discharge home.  He is discharged home with Cardizem CD 120mg daily and Eliquis 5mg PO BID x1 week.  CHADS-Vasc score 0 but given conversion to NSR patient recommended by EP to take AC for at least one week.  He is recommended to follow up with the Cardiologist Dr. Heller and Electrophysiologist Dr. Willett in 1-2 weeks.  New prescriptions are E prescribed to pharmacy of choice.          20yo Male w/ FHx of HTN (denies FHx of cardiac arrhythmias) and PMHx Asthma who presents to [facility] ED 8/14/2021 endorsing palpitations after finishing a run.  At [facility] ED EKG revealing Aflutter.  Patient s/p Lopressor and Dilt Gtt which was discontinued initially as patient HR controlled.  Patient transferred to [facility] for new Afib.  THS wnl.  Urine Toxicology negative and patient denies any drug use, supplementation, ETOH, or significant caffeine use.  Patient initiated on Cardizem [id number] QD.  EP service consulted and patient is s/p Eliquis and Flecainide 300mg PO x1 w/ successful conversion to NSR.  Labs, Vitals, meds reviewed with Dr. Macdonald and patient deemed stable for discharge home.  He is discharged home with Cardizem CD 120mg daily and Eliquis 5mg PO BID x1 week.  CHADS-Vasc score 0 but given conversion to NSR patient recommended by EP to take AC for at least one week.  He is recommended to follow up with the Cardiologist Dr. Heller and Electrophysiologist Dr. Willett in 1-2 weeks.  New prescriptions are E prescribed to pharmacy of choice.     I was physically present for the key portions of the evaluation and management (E/M) service provided.  I have personally seen and examined this patient.  I have reviewed vitals, labs, medications, cardiac studies and remaining additional imaging.  I agree with the above discharge documentation which I have reviewed and edited where appropriate. Patient is hemodynamically stable and appropriate for discharge home on the above prescribed medications.  Close outpatient cardiology follow up is recommended within 1-2 weeks.    Brandy Macdonald MD   Cardiology Attending    35 minutes spent on total encounter; more than 50% of the visit was spent counseling and/or coordinating care by the attending physician, with plan of care discussed with the patient and cardiac team.

## 2021-08-16 PROBLEM — J45.909 UNSPECIFIED ASTHMA, UNCOMPLICATED: Chronic | Status: ACTIVE | Noted: 2021-08-15

## 2021-08-16 PROBLEM — Z00.00 ENCOUNTER FOR PREVENTIVE HEALTH EXAMINATION: Status: ACTIVE | Noted: 2021-08-16

## 2021-08-18 ENCOUNTER — APPOINTMENT (OUTPATIENT)
Dept: HEART AND VASCULAR | Facility: CLINIC | Age: 21
End: 2021-08-18
Payer: COMMERCIAL

## 2021-08-18 ENCOUNTER — NON-APPOINTMENT (OUTPATIENT)
Age: 21
End: 2021-08-18

## 2021-08-18 VITALS
BODY MASS INDEX: 28.17 KG/M2 | OXYGEN SATURATION: 99 % | SYSTOLIC BLOOD PRESSURE: 114 MMHG | DIASTOLIC BLOOD PRESSURE: 70 MMHG | HEART RATE: 65 BPM | WEIGHT: 208 LBS | HEIGHT: 72 IN

## 2021-08-18 DIAGNOSIS — Z78.9 OTHER SPECIFIED HEALTH STATUS: ICD-10-CM

## 2021-08-18 PROCEDURE — 93000 ELECTROCARDIOGRAM COMPLETE: CPT

## 2021-08-18 PROCEDURE — 99214 OFFICE O/P EST MOD 30 MIN: CPT

## 2021-08-18 RX ORDER — ALBUTEROL SULFATE 90 UG/1
AEROSOL, METERED RESPIRATORY (INHALATION)
Refills: 0 | Status: ACTIVE | COMMUNITY

## 2021-08-18 NOTE — REASON FOR VISIT
[Symptom and Test Evaluation] : symptom and test evaluation [FreeTextEntry1] : 22yo Male w/ FHx of HTN (denies FHx of cardiac arrhythmias) and PMHx\par Asthma who presents to [facility] ED 8/14/2021 endorsing palpitations. Pt states that\par when he went for a run today and started to feel like his heart was skipping a\par beat. He went to an urgent care, and was sent to the ED due to irregular\par rhythm. He has never had symptoms. Pt denies Hx of arrhythmias, denies\par recreational drug use, denies tobacco use, denies use of caffeine or\par pre-workout or supplement use. Pt denies CP, SOB, dizziness/syncope, LE edema,\par orthopnea, abdominal pain, N/V, fever/chills, pain in lower extremities.\par

## 2021-08-18 NOTE — DISCUSSION/SUMMARY
[FreeTextEntry1] : Palps/pAF will bring in for echocardiogram and a stress ekg \par EKG section of the chart --- secondary to symptoms above an electrocardiogram also known as an EKG was performed.  Risks and benefits discussed with the patient. Patient was given time and privacy to changed into a gown. Shortly after, standard 10 leads were applied and a [employer] system was used to perform the study. The results were subsequently reviewed by attending physician and discussed with the patient. The study showed a normal sinus rhythm and no ST-T suggestive of ischemia. Order for the EKG was placed in the chart. The results were documented. Billing submitted. Emotional support provided.\par

## 2021-08-20 DIAGNOSIS — I48.0 PAROXYSMAL ATRIAL FIBRILLATION: ICD-10-CM

## 2021-08-20 DIAGNOSIS — I48.92 UNSPECIFIED ATRIAL FLUTTER: ICD-10-CM

## 2021-08-20 DIAGNOSIS — J45.909 UNSPECIFIED ASTHMA, UNCOMPLICATED: ICD-10-CM

## 2021-08-20 DIAGNOSIS — Z82.49 FAMILY HISTORY OF ISCHEMIC HEART DISEASE AND OTHER DISEASES OF THE CIRCULATORY SYSTEM: ICD-10-CM

## 2021-09-09 ENCOUNTER — TRANSCRIPTION ENCOUNTER (OUTPATIENT)
Age: 21
End: 2021-09-09

## 2021-09-20 ENCOUNTER — APPOINTMENT (OUTPATIENT)
Dept: HEART AND VASCULAR | Facility: CLINIC | Age: 21
End: 2021-09-20
Payer: COMMERCIAL

## 2021-09-20 PROCEDURE — 99214 OFFICE O/P EST MOD 30 MIN: CPT | Mod: 25

## 2021-09-20 PROCEDURE — 93306 TTE W/DOPPLER COMPLETE: CPT

## 2021-09-20 PROCEDURE — 93015 CV STRESS TEST SUPVJ I&R: CPT

## 2021-09-20 PROCEDURE — 99072 ADDL SUPL MATRL&STAF TM PHE: CPT

## 2021-09-20 NOTE — DISCUSSION/SUMMARY
[FreeTextEntry1] : pAF/palps/abnormal ekg echocardiogram and a stress ekg reviewed. will provide info for primary care. Inclined towards a conservative follow up in this patient. We had a careful discussion regarding diet and exercise. Will be happy to re-evaluate. Holter if symptoms.

## 2021-09-20 NOTE — REASON FOR VISIT
[FreeTextEntry1] : 22 yo male w/ FHx of HTN (denies FHx of cardiac arrhythmias) and pmhx of asthma presents to [facility] ED 8/14/2021 for palpitations after a run. He went to an urgent care, and was sent to the ED due to irregular\par rhythm. He has never had symptoms. Pt denies Hx of arrhythmias, denies recreational drug use, denies tobacco use, denies use of caffeine or pre-workout or supplement use. Denies CP, SOB/TIMMONS, palpitations, SCOT and dizziness/syncope. He is here today for stress echo.\par

## 2021-10-25 ENCOUNTER — APPOINTMENT (OUTPATIENT)
Dept: HEART AND VASCULAR | Facility: CLINIC | Age: 21
End: 2021-10-25

## 2022-01-24 ENCOUNTER — NON-APPOINTMENT (OUTPATIENT)
Age: 22
End: 2022-01-24

## 2022-01-24 ENCOUNTER — APPOINTMENT (OUTPATIENT)
Dept: HEART AND VASCULAR | Facility: CLINIC | Age: 22
End: 2022-01-24
Payer: COMMERCIAL

## 2022-01-24 VITALS
DIASTOLIC BLOOD PRESSURE: 56 MMHG | HEIGHT: 72 IN | SYSTOLIC BLOOD PRESSURE: 124 MMHG | HEART RATE: 67 BPM | OXYGEN SATURATION: 98 % | WEIGHT: 211 LBS | BODY MASS INDEX: 28.58 KG/M2

## 2022-01-24 PROCEDURE — 99214 OFFICE O/P EST MOD 30 MIN: CPT

## 2022-01-24 PROCEDURE — 93000 ELECTROCARDIOGRAM COMPLETE: CPT

## 2022-01-24 RX ORDER — PANTOPRAZOLE 40 MG/1
40 TABLET, DELAYED RELEASE ORAL
Refills: 0 | Status: ACTIVE | COMMUNITY

## 2022-01-24 NOTE — DISCUSSION/SUMMARY
[FreeTextEntry1] : Palps/abnormal ekg will check 7 day holter if symptoms noted meet AIM speciality health requirements.\par EKG section of the chart --- secondary to symptoms above an electrocardiogram also known as an EKG was performed.  Risks and benefits discussed with the patient. Patient was given time and privacy to changed into a gown. Shortly after, standard 10 leads were applied and a "[employer] system was used to perform the study. The results were subsequently reviewed by attending physician and discussed with the patient. The study showed a normal sinus rhythm and no ST-T suggestive of ischemia. Order for the EKG was placed in the chart. The results were documented. Billing submitted. Emotional support provided.\par HTN - JULIO  and I had an extensive discussion regarding his blood pressure management. Patient will continue taking current medications in addition to maintaining a low Na diet, with periodic b/p checks at home.\par

## 2022-01-24 NOTE — REASON FOR VISIT
[Symptom and Test Evaluation] : symptom and test evaluation [FreeTextEntry1] : Danis comes in with palpitations. He notes two different types of symptoms. Short episodes of intermittent fast hear beats noted at rest. No chest pain or syncope. In addition, he does remark on something similar to prior AF episodes. He is on atenalol for blood pressure control. We are discussing further car.e

## 2022-02-11 ENCOUNTER — APPOINTMENT (OUTPATIENT)
Dept: HEART AND VASCULAR | Facility: CLINIC | Age: 22
End: 2022-02-11
Payer: COMMERCIAL

## 2022-02-11 VITALS
HEIGHT: 72 IN | HEART RATE: 57 BPM | TEMPERATURE: 97.6 F | OXYGEN SATURATION: 100 % | BODY MASS INDEX: 29.17 KG/M2 | SYSTOLIC BLOOD PRESSURE: 134 MMHG | DIASTOLIC BLOOD PRESSURE: 83 MMHG | WEIGHT: 215.38 LBS

## 2022-02-11 PROCEDURE — 99214 OFFICE O/P EST MOD 30 MIN: CPT

## 2022-02-11 PROCEDURE — 99204 OFFICE O/P NEW MOD 45 MIN: CPT

## 2022-02-11 NOTE — REASON FOR VISIT
[Symptom and Test Evaluation] : symptom and test evaluation [FreeTextEntry1] : Patient comes in for a follow-up and re-evaluation. He is doing well. Occasional fatigue is noted. He will be seeing EPS, Dr Willett today. He is also scheduled for endoscopy and colonoscopy with Dr Linda. No issues with anesthesia in the past. \par \par \par Dr Sebastian Linda fax

## 2022-02-11 NOTE — DISCUSSION/SUMMARY
[FreeTextEntry1] : Pre-op (endoscopy and colonoscopy) From cardiovascular standpoint, Mr. MCKEON is of acceptable risk for the planned procedure and may move forward without further cardiovascular testing.\par Palps/AF genetic testing and EPS follow up, along with cMRI pending\par Borderline HTN - JULIO  and I had an extensive discussion regarding his blood pressure management. Patient will continue taking current medications in addition to maintaining a low Na diet, with periodic b/p checks at home.\par

## 2022-02-18 ENCOUNTER — OUTPATIENT (OUTPATIENT)
Dept: OUTPATIENT SERVICES | Facility: HOSPITAL | Age: 22
LOS: 1 days | End: 2022-02-18
Payer: COMMERCIAL

## 2022-02-18 ENCOUNTER — APPOINTMENT (OUTPATIENT)
Dept: MRI IMAGING | Facility: HOSPITAL | Age: 22
End: 2022-02-18

## 2022-02-18 LAB — POCT ISTAT CREATININE: 1.3 MG/DL — SIGNIFICANT CHANGE UP (ref 0.5–1.3)

## 2022-02-18 PROCEDURE — 82565 ASSAY OF CREATININE: CPT

## 2022-02-18 PROCEDURE — 75561 CARDIAC MRI FOR MORPH W/DYE: CPT | Mod: 26

## 2022-02-18 PROCEDURE — A9577: CPT

## 2022-02-18 PROCEDURE — 75561 CARDIAC MRI FOR MORPH W/DYE: CPT

## 2022-02-22 NOTE — HISTORY OF PRESENT ILLNESS
[FreeTextEntry1] : Mr. Krishnamurthy is a 21 year-old male with limited past medical history presents to discuss his recent diagnosis of paroxysmal atrial fibrillation.  His first 2 episodes were only brief in duration and he was unsure what was going on.  He attributed them to anxiety.  In August 2021 he had an episode that last approximately 1 day, prompting him to present to the ER.  He was noted to be in AF and underwent chemical cardioversion with flecainide.  He had a repeat episode in January of 2022 which lasted 36 hours.  He managed that episode with diltiazem until spontaneous termination.  He reports that his 2 longer episodes were characterized by palpitations, SOB, dizziness, and chest discomfort.  The episodes were triggered by positional changes.  \par \par His workup has included event monitoring and genetic testing with Dr. Perez at [facility]. He was told that both these tests were within normal limits.  He reports a stable exercise tolerance and compliance with his atenolol.    \par \par \par \par

## 2022-02-22 NOTE — DISCUSSION/SUMMARY
[FreeTextEntry1] : Mr. Krishnamurthy is a 21 year-old male with paroxysmal AF.  Given his young age and resting bradycardia, I would like to review his genetics testing (ensuring he has been evaluated for SCN5A mutations among others).  I would also like him to undergo cardiac MRI to r/o the presence of myocarditis.  If his workup is unrevealing, we will consider EP study to r/o the presence of an accessory pathway.  He will remain on atenolol for rate control at this time.  F/u in 1-2 months

## 2022-02-22 NOTE — CARDIOLOGY SUMMARY
[de-identified] : Sinus sarai at 58bpm, normal axis/intervals [de-identified] : NSR, sinus sarai, paroxysms of AF with controlled VR [de-identified] : 16 mins on Keyon protocol with no ischemia [de-identified] : EF 60-65%, normal LA size, no valve abnormalities

## 2022-03-02 ENCOUNTER — APPOINTMENT (OUTPATIENT)
Dept: HEART AND VASCULAR | Facility: CLINIC | Age: 22
End: 2022-03-02

## 2022-03-21 ENCOUNTER — APPOINTMENT (OUTPATIENT)
Dept: HEART AND VASCULAR | Facility: CLINIC | Age: 22
End: 2022-03-21
Payer: COMMERCIAL

## 2022-03-21 VITALS
BODY MASS INDEX: 28.31 KG/M2 | DIASTOLIC BLOOD PRESSURE: 74 MMHG | HEART RATE: 59 BPM | HEIGHT: 72 IN | OXYGEN SATURATION: 99 % | WEIGHT: 209 LBS | SYSTOLIC BLOOD PRESSURE: 130 MMHG

## 2022-03-21 DIAGNOSIS — Z01.810 ENCOUNTER FOR PREPROCEDURAL CARDIOVASCULAR EXAMINATION: ICD-10-CM

## 2022-03-21 PROCEDURE — 99214 OFFICE O/P EST MOD 30 MIN: CPT

## 2022-03-21 RX ORDER — ATENOLOL 100 MG/1
100 TABLET ORAL
Refills: 0 | Status: COMPLETED | COMMUNITY
End: 2022-03-21

## 2022-03-21 NOTE — REASON FOR VISIT
[Symptom and Test Evaluation] : symptom and test evaluation [FreeTextEntry1] : Danis comes in for a follow up and re-evaluation. cMR was normal. He is scheduled for a visit with Dr Willett. In addition, he wants to try to cut back on atenolol to get allergy testing.

## 2022-03-21 NOTE — DISCUSSION/SUMMARY
[FreeTextEntry1] : HTN medical management will reducee atenolol and re-evaluate, renal u/s\par AF cMR reviewed with patient, recommend careful symptoms monitoring and follow up with Dr Willett as planned. \par Palps improving cont to monitor

## 2022-04-08 ENCOUNTER — APPOINTMENT (OUTPATIENT)
Dept: HEART AND VASCULAR | Facility: CLINIC | Age: 22
End: 2022-04-08
Payer: COMMERCIAL

## 2022-04-08 VITALS
SYSTOLIC BLOOD PRESSURE: 149 MMHG | DIASTOLIC BLOOD PRESSURE: 77 MMHG | TEMPERATURE: 97.3 F | WEIGHT: 206.38 LBS | BODY MASS INDEX: 27.95 KG/M2 | HEART RATE: 60 BPM | HEIGHT: 72 IN

## 2022-04-08 PROCEDURE — 99214 OFFICE O/P EST MOD 30 MIN: CPT | Mod: 25

## 2022-04-08 PROCEDURE — 93000 ELECTROCARDIOGRAM COMPLETE: CPT

## 2022-04-11 ENCOUNTER — NON-APPOINTMENT (OUTPATIENT)
Age: 22
End: 2022-04-11

## 2022-04-11 NOTE — DISCUSSION/SUMMARY
[FreeTextEntry1] : Mr. Krishnamurthy is a 21 year-old male with paroxysmal AF.  Given his young age and resting bradycardia, I would like to review his genetics testing (ensuring he has been evaluated for SCN5A mutations among others). His MRI is negative.   I have asked him to consider EP study to r/o the presence of an accessory pathway.  He will remain on atenolol for rate control at this time.  F/u in 3-4 months

## 2022-04-11 NOTE — CARDIOLOGY SUMMARY
Procedures: [de-identified] : Sinus sarai at 58bpm, normal axis/intervals [de-identified] : NSR, sinus sarai, paroxysms of AF with controlled VR [de-identified] : 16 mins on Keyon protocol with no ischemia [de-identified] : EF 60-65%, normal LA size, no valve abnormalities [de-identified] : Normal chambers, No LGE, Normal LVEF, prominent trabeculations

## 2022-04-11 NOTE — HISTORY OF PRESENT ILLNESS
[FreeTextEntry1] : Mr. Krishnamurthy is a 21 year-old male with limited past medical history presents to discuss his recent diagnosis of paroxysmal atrial fibrillation.  His first 2 episodes were only brief in duration and he was unsure what was going on.  He attributed them to anxiety.  In August 2021 he had an episode that last approximately 1 day, prompting him to present to the ER.  He was noted to be in AF and underwent chemical cardioversion with flecainide.  He had a repeat episode in January of 2022 which lasted 36 hours.  He managed that episode with diltiazem until spontaneous termination.  He reports that his 2 longer episodes were characterized by palpitations, SOB, dizziness, and chest discomfort.  The episodes were triggered by positional changes.  \par \par His workup has included event monitoring and genetic testing with Dr. Perez at [facility]. He was told that both these tests were within normal limits.  He reports a stable exercise tolerance and compliance with his atenolol.    \par \par \par \par

## 2022-06-22 ENCOUNTER — RX RENEWAL (OUTPATIENT)
Age: 22
End: 2022-06-22

## 2022-08-24 ENCOUNTER — APPOINTMENT (OUTPATIENT)
Dept: HEART AND VASCULAR | Facility: CLINIC | Age: 22
End: 2022-08-24
Payer: COMMERCIAL

## 2022-08-24 VITALS — DIASTOLIC BLOOD PRESSURE: 88 MMHG | SYSTOLIC BLOOD PRESSURE: 128 MMHG

## 2022-08-24 VITALS
WEIGHT: 235.06 LBS | SYSTOLIC BLOOD PRESSURE: 143 MMHG | HEART RATE: 62 BPM | TEMPERATURE: 97.3 F | HEIGHT: 72 IN | BODY MASS INDEX: 31.84 KG/M2 | OXYGEN SATURATION: 99 % | DIASTOLIC BLOOD PRESSURE: 86 MMHG

## 2022-08-24 PROCEDURE — 93976 VASCULAR STUDY: CPT

## 2022-08-24 PROCEDURE — 99072 ADDL SUPL MATRL&STAF TM PHE: CPT

## 2022-08-24 PROCEDURE — 99214 OFFICE O/P EST MOD 30 MIN: CPT

## 2022-08-24 NOTE — DISCUSSION/SUMMARY
[FreeTextEntry1] : SOB/palpitations will check echocardiogram if able to approve and schedule.\par Borderline HTN will taper atenolol to 25 mg renal u/s reviewed, will re-evaluate\par pAF EPS follow up, echo if able to approve and re-evaluation of symptoms.

## 2022-09-09 ENCOUNTER — APPOINTMENT (OUTPATIENT)
Dept: HEART AND VASCULAR | Facility: CLINIC | Age: 22
End: 2022-09-09

## 2022-09-09 VITALS
HEART RATE: 59 BPM | BODY MASS INDEX: 31.42 KG/M2 | HEIGHT: 72 IN | OXYGEN SATURATION: 100 % | WEIGHT: 232 LBS | DIASTOLIC BLOOD PRESSURE: 79 MMHG | SYSTOLIC BLOOD PRESSURE: 135 MMHG | TEMPERATURE: 96.9 F

## 2022-09-09 PROCEDURE — 99213 OFFICE O/P EST LOW 20 MIN: CPT

## 2022-09-11 NOTE — HISTORY OF PRESENT ILLNESS
[FreeTextEntry1] : Mr. Krishnamurthy is a 21 year-old male with limited past medical history presents to discuss his recent diagnosis of paroxysmal atrial fibrillation.  His first 2 episodes were only brief in duration and he was unsure what was going on.  He attributed them to anxiety.  In August 2021 he had an episode that last approximately 1 day, prompting him to present to the ER.  He was noted to be in AF and underwent chemical cardioversion with flecainide.  He had a repeat episode in January of 2022 which lasted 36 hours.  He managed that episode with diltiazem until spontaneous termination.  He reports that his 2 longer episodes were characterized by palpitations, SOB, dizziness, and chest discomfort.  The episodes were triggered by positional changes.  \par \par His workup has included event monitoring and genetic testing with Dr. Perez at [facility]. He was told that both these tests were within normal limits.  He reports a stable exercise tolerance and compliance with his atenolol.    \par \par \par \par

## 2022-09-11 NOTE — CARDIOLOGY SUMMARY
[de-identified] : Sinus sarai at 58bpm, normal axis/intervals [de-identified] : NSR, sinus sarai, paroxysms of AF with controlled VR [de-identified] : 16 mins on Keyon protocol with no ischemia [de-identified] : EF 60-65%, normal LA size, no valve abnormalities [de-identified] : Normal chambers, No LGE, Normal LVEF, prominent trabeculations

## 2022-09-26 ENCOUNTER — APPOINTMENT (OUTPATIENT)
Dept: HEART AND VASCULAR | Facility: CLINIC | Age: 22
End: 2022-09-26

## 2022-09-26 VITALS
TEMPERATURE: 97.9 F | OXYGEN SATURATION: 98 % | HEIGHT: 72 IN | SYSTOLIC BLOOD PRESSURE: 133 MMHG | HEART RATE: 55 BPM | BODY MASS INDEX: 29.97 KG/M2 | WEIGHT: 221.25 LBS | DIASTOLIC BLOOD PRESSURE: 90 MMHG

## 2022-09-26 PROCEDURE — 93306 TTE W/DOPPLER COMPLETE: CPT

## 2022-09-26 PROCEDURE — 99072 ADDL SUPL MATRL&STAF TM PHE: CPT

## 2022-09-26 PROCEDURE — 99214 OFFICE O/P EST MOD 30 MIN: CPT

## 2022-09-26 NOTE — DISCUSSION/SUMMARY
[FreeTextEntry1] : pAF Inclined towards a conservative follow up in this patient. We had a careful discussion regarding diet and exercise. Will be happy to re-evaluate. EPS follow up\par Borderline HTN - JULIO  and I had an extensive discussion regarding his blood pressure management. Patient will continue taking current medications in addition to maintaining a low Na diet, with periodic b/p checks at home.\par

## 2022-09-26 NOTE — REASON FOR VISIT
[Symptom and Test Evaluation] : symptom and test evaluation [Hyperlipidemia] : hyperlipidemia [Hypertension] : hypertension [FreeTextEntry1] : 23 yo man with HTN and pAfib (on atenolol for rate control). He is here today for an echo and to discuss further work-up. Denies CP, SOB/TIMMONS, palpitations and dizziness. Pt is still thinking about EPS in the future but will defer for now since he has not had an afib episode since last February.

## 2023-01-27 ENCOUNTER — NON-APPOINTMENT (OUTPATIENT)
Age: 23
End: 2023-01-27

## 2023-01-27 ENCOUNTER — APPOINTMENT (OUTPATIENT)
Dept: HEART AND VASCULAR | Facility: CLINIC | Age: 23
End: 2023-01-27
Payer: COMMERCIAL

## 2023-01-27 VITALS
TEMPERATURE: 97.6 F | OXYGEN SATURATION: 99 % | WEIGHT: 215 LBS | BODY MASS INDEX: 27.59 KG/M2 | SYSTOLIC BLOOD PRESSURE: 151 MMHG | DIASTOLIC BLOOD PRESSURE: 85 MMHG | HEIGHT: 74 IN | HEART RATE: 69 BPM

## 2023-01-27 VITALS — SYSTOLIC BLOOD PRESSURE: 144 MMHG | DIASTOLIC BLOOD PRESSURE: 82 MMHG

## 2023-01-27 PROCEDURE — 99072 ADDL SUPL MATRL&STAF TM PHE: CPT

## 2023-01-27 PROCEDURE — 99214 OFFICE O/P EST MOD 30 MIN: CPT

## 2023-01-27 RX ORDER — ATENOLOL 25 MG/1
25 TABLET ORAL DAILY
Qty: 30 | Refills: 9 | Status: COMPLETED | COMMUNITY
Start: 2022-03-21 | End: 2023-01-27

## 2023-01-27 NOTE — REASON FOR VISIT
[Symptom and Test Evaluation] : symptom and test evaluation [FreeTextEntry1] : Mani comes in for a follow up and re-evaluation. No chest pain noted. No dyspnea. He is exercising regularly. He stopped atenolol. He is a bit hypertensive on arrival.

## 2023-01-27 NOTE — DISCUSSION/SUMMARY
[FreeTextEntry1] : Palps check stress ekg, as we unable to approve a stress echo. EPS notes reviewed and plan discussed with the patient. \par Borderline HTN wants to try diet and doing b/p checks at home

## 2023-03-10 ENCOUNTER — APPOINTMENT (OUTPATIENT)
Dept: HEART AND VASCULAR | Facility: CLINIC | Age: 23
End: 2023-03-10

## 2023-03-14 ENCOUNTER — APPOINTMENT (OUTPATIENT)
Dept: HEART AND VASCULAR | Facility: CLINIC | Age: 23
End: 2023-03-14
Payer: COMMERCIAL

## 2023-03-14 PROCEDURE — 99442: CPT

## 2023-03-14 PROCEDURE — 93015 CV STRESS TEST SUPVJ I&R: CPT

## 2023-05-30 NOTE — ED ADULT NURSE NOTE - CAS DISCH ACCOMP BY
Following her last visit 2/24/2023, patient was to return in about 6 weeks (around 4/7/2023). Due to her work schedule/limited availability, the soonest appointment I could schedule her for is 7/12/2023. Self

## 2023-10-11 ENCOUNTER — NON-APPOINTMENT (OUTPATIENT)
Age: 23
End: 2023-10-11

## 2023-10-11 ENCOUNTER — APPOINTMENT (OUTPATIENT)
Dept: HEART AND VASCULAR | Facility: CLINIC | Age: 23
End: 2023-10-11
Payer: COMMERCIAL

## 2023-10-11 VITALS
BODY MASS INDEX: 26.82 KG/M2 | HEIGHT: 74 IN | TEMPERATURE: 97.3 F | WEIGHT: 209 LBS | DIASTOLIC BLOOD PRESSURE: 86 MMHG | SYSTOLIC BLOOD PRESSURE: 162 MMHG | OXYGEN SATURATION: 99 % | HEART RATE: 69 BPM

## 2023-10-11 PROCEDURE — 93000 ELECTROCARDIOGRAM COMPLETE: CPT

## 2023-10-11 PROCEDURE — 99214 OFFICE O/P EST MOD 30 MIN: CPT

## 2023-10-11 RX ORDER — DILTIAZEM HYDROCHLORIDE 60 MG/1
60 TABLET ORAL
Qty: 60 | Refills: 2 | Status: ACTIVE | COMMUNITY
Start: 2023-10-11 | End: 1900-01-01

## 2023-10-12 ENCOUNTER — APPOINTMENT (OUTPATIENT)
Dept: HEART AND VASCULAR | Facility: CLINIC | Age: 23
End: 2023-10-12
Payer: COMMERCIAL

## 2023-10-12 VITALS — DIASTOLIC BLOOD PRESSURE: 82 MMHG | HEART RATE: 70 BPM | SYSTOLIC BLOOD PRESSURE: 144 MMHG

## 2023-10-12 PROCEDURE — 93306 TTE W/DOPPLER COMPLETE: CPT

## 2023-10-13 ENCOUNTER — NON-APPOINTMENT (OUTPATIENT)
Age: 23
End: 2023-10-13

## 2023-10-13 ENCOUNTER — APPOINTMENT (OUTPATIENT)
Dept: HEART AND VASCULAR | Facility: CLINIC | Age: 23
End: 2023-10-13
Payer: COMMERCIAL

## 2023-10-13 VITALS
BODY MASS INDEX: 26.82 KG/M2 | HEIGHT: 74 IN | TEMPERATURE: 96.5 F | OXYGEN SATURATION: 97 % | WEIGHT: 209 LBS | SYSTOLIC BLOOD PRESSURE: 132 MMHG | DIASTOLIC BLOOD PRESSURE: 85 MMHG | HEART RATE: 68 BPM

## 2023-10-13 PROCEDURE — 93000 ELECTROCARDIOGRAM COMPLETE: CPT

## 2023-10-13 PROCEDURE — 99214 OFFICE O/P EST MOD 30 MIN: CPT | Mod: 25

## 2023-10-13 RX ORDER — FLECAINIDE ACETATE 100 MG/1
100 TABLET ORAL ONCE
Qty: 4 | Refills: 0 | Status: DISCONTINUED | COMMUNITY
Start: 2023-10-13 | End: 2023-10-13

## 2023-11-15 ENCOUNTER — NON-APPOINTMENT (OUTPATIENT)
Age: 23
End: 2023-11-15

## 2023-11-15 ENCOUNTER — APPOINTMENT (OUTPATIENT)
Dept: HEART AND VASCULAR | Facility: CLINIC | Age: 23
End: 2023-11-15
Payer: COMMERCIAL

## 2023-11-15 VITALS
BODY MASS INDEX: 26.18 KG/M2 | SYSTOLIC BLOOD PRESSURE: 144 MMHG | HEIGHT: 74 IN | OXYGEN SATURATION: 100 % | HEART RATE: 72 BPM | WEIGHT: 204 LBS | DIASTOLIC BLOOD PRESSURE: 87 MMHG | TEMPERATURE: 97.9 F

## 2023-11-15 DIAGNOSIS — R00.2 PALPITATIONS: ICD-10-CM

## 2023-11-15 DIAGNOSIS — I48.0 PAROXYSMAL ATRIAL FIBRILLATION: ICD-10-CM

## 2023-11-15 DIAGNOSIS — R03.0 ELEVATED BLOOD-PRESSURE READING, W/OUT DIAGNOSIS OF HYPERTENSION: ICD-10-CM

## 2023-11-15 DIAGNOSIS — R94.31 ABNORMAL ELECTROCARDIOGRAM [ECG] [EKG]: ICD-10-CM

## 2023-11-15 PROCEDURE — 99214 OFFICE O/P EST MOD 30 MIN: CPT

## 2023-11-15 PROCEDURE — 93000 ELECTROCARDIOGRAM COMPLETE: CPT

## 2023-11-16 VITALS — SYSTOLIC BLOOD PRESSURE: 133 MMHG | DIASTOLIC BLOOD PRESSURE: 82 MMHG | HEART RATE: 70 BPM

## 2023-11-16 RX ORDER — APIXABAN 5 MG/1
5 TABLET, FILM COATED ORAL
Qty: 60 | Refills: 3 | Status: COMPLETED | COMMUNITY
Start: 2023-10-11 | End: 2023-11-16

## 2023-11-27 ENCOUNTER — APPOINTMENT (OUTPATIENT)
Dept: HEART AND VASCULAR | Facility: CLINIC | Age: 23
End: 2023-11-27

## 2024-05-15 ENCOUNTER — APPOINTMENT (OUTPATIENT)
Dept: HEART AND VASCULAR | Facility: CLINIC | Age: 24
End: 2024-05-15

## 2025-03-20 ENCOUNTER — NON-APPOINTMENT (OUTPATIENT)
Age: 25
End: 2025-03-20

## 2025-03-20 ENCOUNTER — APPOINTMENT (OUTPATIENT)
Dept: UROLOGY | Facility: CLINIC | Age: 25
End: 2025-03-20
Payer: COMMERCIAL

## 2025-03-20 VITALS
OXYGEN SATURATION: 99 % | RESPIRATION RATE: 16 BRPM | SYSTOLIC BLOOD PRESSURE: 154 MMHG | DIASTOLIC BLOOD PRESSURE: 81 MMHG | TEMPERATURE: 97.2 F | HEART RATE: 78 BPM

## 2025-03-20 PROCEDURE — 99203 OFFICE O/P NEW LOW 30 MIN: CPT

## 2025-03-21 LAB
APPEARANCE: CLEAR
BACTERIA: NEGATIVE /HPF
BILIRUBIN URINE: NEGATIVE
BLOOD URINE: NEGATIVE
CALCIUM OXALATE CRYSTALS: PRESENT
CAST: 0 /LPF
COLOR: YELLOW
EPITHELIAL CELLS: 0 /HPF
GLUCOSE QUALITATIVE U: NEGATIVE MG/DL
KETONES URINE: NEGATIVE MG/DL
LEUKOCYTE ESTERASE URINE: NEGATIVE
MICROSCOPIC-UA: NORMAL
NITRITE URINE: NEGATIVE
PH URINE: 6
PROTEIN URINE: NORMAL MG/DL
RED BLOOD CELLS URINE: 1 /HPF
REVIEW: NORMAL
SPECIFIC GRAVITY URINE: >1.03
UROBILINOGEN URINE: 0.2 MG/DL
WHITE BLOOD CELLS URINE: 0 /HPF

## 2025-03-24 ENCOUNTER — TRANSCRIPTION ENCOUNTER (OUTPATIENT)
Age: 25
End: 2025-03-24

## 2025-03-24 LAB — BACTERIA UR CULT: NORMAL
